# Patient Record
Sex: FEMALE | Race: WHITE | NOT HISPANIC OR LATINO | Employment: FULL TIME | URBAN - METROPOLITAN AREA
[De-identification: names, ages, dates, MRNs, and addresses within clinical notes are randomized per-mention and may not be internally consistent; named-entity substitution may affect disease eponyms.]

---
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
## 2021-05-27 NOTE — TELEPHONE ENCOUNTER
Due to be seen    Rx renewed per Medication Renewal Policy. Medication was last renewed on 8/4/18.    Lydia Estes, [facility] Triage/Med Refill 4/9/2019     Requested Prescriptions   Pending Prescriptions Disp Refills     TRI-SPRINTEC, 28, 0.18/0.215/0.25 mg-35 mcg (28) Tab tablet [Pharmacy Med Name: TRI-SPRINTEC TABLETS 28] 84 tablet 0     Sig: TAKE 1 TABLET BY MOUTH DAILY       Oral Contraceptives Protocol Passed - 4/8/2019  3:28 AM        Passed - Visit with PCP or prescribing provider visit in last 12 months      Last office visit with prescriber/PCP: Visit date not found OR same dept: 5/1/2018 Nelda Tillman NP OR same specialty: 5/1/2018 Nelda Tillman NP  Last physical: 5/9/2018 Last MTM visit: Visit date not found   Next visit within 3 mo: Visit date not found  Next physical within 3 mo: Visit date not found  Prescriber OR PCP: Dianna De Jesus MD  Last diagnosis associated with med order: 1. Contraception  - TRI-SPRINTEC, 28, 0.18/0.215/0.25 mg-35 mcg (28) Tab tablet [Pharmacy Med Name: TRI-SPRINTEC TABLETS 28]; TAKE 1 TABLET BY MOUTH DAILY  Dispense: 84 tablet; Refill: 0    If protocol passes may refill for 12 months if within 3 months of last provider visit (or a total of 15 months).

## 2021-05-27 NOTE — TELEPHONE ENCOUNTER
Called and informed patient via voicemail that her rx was sent in and that she will need a physical before her next refill will be given.     Susy Real, CMA

## 2021-05-27 NOTE — TELEPHONE ENCOUNTER
Please call her and let her know that we did refill her medication for 3 months however she needs to be seen for physical exam prior to any further refills.

## 2021-06-01 VITALS — WEIGHT: 116 LBS | BODY MASS INDEX: 18.64 KG/M2 | HEIGHT: 66 IN

## 2021-06-01 VITALS — HEIGHT: 68 IN | WEIGHT: 119.5 LBS | BODY MASS INDEX: 18.11 KG/M2

## 2021-06-02 VITALS — WEIGHT: 118 LBS | BODY MASS INDEX: 18.07 KG/M2

## 2021-06-03 NOTE — TELEPHONE ENCOUNTER
Please call her and let her know that we did refill her medication for 2 months however she needs to be seen for physical exam prior to any further refills.   Please assist patient with scheduling a physical exam sometime within the next month or 2.

## 2021-06-03 NOTE — TELEPHONE ENCOUNTER
RN cannot approve Refill Request    RN can NOT refill this medication Protocol failed and NO refill given. Last office visit: Visit date not found Last Physical: 5/9/2018 Last MTM visit: Visit date not found Last visit same specialty: 5/1/2018 Nelda Tillman NP.  Next visit within 3 mo: Visit date not found  Next physical within 3 mo: Visit date not found      Roxana Mohamud, [facility] Triage/Med Refill 11/21/2019    Requested Prescriptions   Pending Prescriptions Disp Refills     TRI-SPRINTEC, 28, 0.18/0.215/0.25 mg-35 mcg (28) tablet [Pharmacy Med Name: TRI-SPRINTEC TABLETS 28] 84 tablet 0     Sig: TAKE 1 TABLET BY MOUTH ONCE DAILY       Oral Contraceptives Protocol Failed - 11/20/2019 11:35 AM        Failed - Visit with PCP or prescribing provider visit in last 12 months      Last office visit with prescriber/PCP: Visit date not found OR same dept: Visit date not found OR same specialty: 5/1/2018 Nelda Tillman NP  Last physical: 5/9/2018 Last MTM visit: Visit date not found   Next visit within 3 mo: Visit date not found  Next physical within 3 mo: Visit date not found  Prescriber OR PCP: Dianna De Jesus MD  Last diagnosis associated with med order: 1. Uses birth control  - TRI-SPRINTEC, 28, 0.18/0.215/0.25 mg-35 mcg (28) tablet [Pharmacy Med Name: TRI-SPRINTEC TABLETS 28]; TAKE 1 TABLET BY MOUTH ONCE DAILY  Dispense: 84 tablet; Refill: 0    If protocol passes may refill for 12 months if within 3 months of last provider visit (or a total of 15 months).

## 2021-06-03 NOTE — PROGRESS NOTES
Chief Complaint   Patient presents with     pain in urination     Urinary Frequency         HPI    Patient is here for a few weeks of burning sensation at the end of urination with increased urinary frequency. NO gross hematuria, fever, flank pain, abdominal pain.    ROS: Pertinent ROS noted in HPI.     No Known Allergies    Patient Active Problem List   Diagnosis     Skin Neoplasm Of Uncertain Behavior     Light-headed feeling       Family History   Problem Relation Age of Onset     Lung disease Mother         carcinoid lung neoplasm removed     Allergic rhinitis Father      Thyroid disease Maternal Grandmother         had thyroid removed     Fibrocystic breast disease Maternal Grandmother      Breast cancer Maternal Grandmother         dx age 60     Fibromyalgia Maternal Grandfather      Breast cancer Paternal Grandmother      Heart disease Paternal Grandfather          age 57     Stroke Paternal Grandfather          age 57       Social History     Socioeconomic History     Marital status: Single     Spouse name: Not on file     Number of children: 0     Years of education: Not on file     Highest education level: Not on file   Occupational History     Occupation: student   Social Needs     Financial resource strain: Not on file     Food insecurity:     Worry: Not on file     Inability: Not on file     Transportation needs:     Medical: Not on file     Non-medical: Not on file   Tobacco Use     Smoking status: Never Smoker     Smokeless tobacco: Never Used   Substance and Sexual Activity     Alcohol use: No     Drug use: No     Sexual activity: Not Currently     Partners: Male   Lifestyle     Physical activity:     Days per week: Not on file     Minutes per session: Not on file     Stress: Not on file   Relationships     Social connections:     Talks on phone: Not on file     Gets together: Not on file     Attends Worship service: Not on file     Active member of club or organization: Not on file      Attends meetings of clubs or organizations: Not on file     Relationship status: Not on file     Intimate partner violence:     Fear of current or ex partner: Not on file     Emotionally abused: Not on file     Physically abused: Not on file     Forced sexual activity: Not on file   Other Topics Concern     Not on file   Social History Narrative     Not on file       Objective:    Vitals:    11/21/19 1256   BP: 120/79   Pulse: 73   Resp: 17   Temp: 98.5  F (36.9  C)   SpO2: 98%       Gen:NAD    CV: RRR, normal S1S2, no M, R, G  Pulm: CTAB, normal effort  Abd: Normal inspection, normal bowel sounds, soft, no pain, no mass/HSM  MSK: normal palpation of back, no CVA tenderness.     Recent Results (from the past 24 hour(s))   Urinalysis-UC if Indicated   Result Value Ref Range    Color, UA Yellow Colorless, Yellow, Straw, Light Yellow    Clarity, UA Clear Clear    Glucose, UA Negative Negative    Bilirubin, UA Negative Negative    Ketones, UA Trace (!) Negative    Specific Gravity, UA >=1.030 1.005 - 1.030    Blood, UA Negative Negative    pH, UA 5.5 5.0 - 8.0    Protein,  mg/dL (!) Negative mg/dL    Urobilinogen, UA 1.0 E.U./dL 0.2 E.U./dL, 1.0 E.U./dL    Nitrite, UA Negative Negative    Leukocytes, UA Trace (!) Negative    Bacteria, UA Moderate (!) None Seen hpf    RBC, UA None Seen None Seen, 0-2 hpf    WBC, UA 5-10 (!) None Seen, 0-5 hpf    Squam Epithel, UA 5-10 (!) None Seen, 0-5 lpf    Mucus, UA Many (!) None Seen lpf         Acute UTI  -     sulfamethoxazole-trimethoprim (BACTRIM DS) 800-160 mg per tablet; Take 1 tablet by mouth 2 (two) times a day for 3 days.    Urinary frequency  -     Urinalysis-UC if Indicated  -     Culture, Urine

## 2021-06-04 VITALS
WEIGHT: 121 LBS | DIASTOLIC BLOOD PRESSURE: 79 MMHG | SYSTOLIC BLOOD PRESSURE: 120 MMHG | BODY MASS INDEX: 18.53 KG/M2 | RESPIRATION RATE: 17 BRPM | HEART RATE: 73 BPM | OXYGEN SATURATION: 98 % | TEMPERATURE: 98.5 F

## 2021-06-16 PROBLEM — R42 LIGHT-HEADED FEELING: Status: ACTIVE | Noted: 2018-05-01

## 2021-06-17 NOTE — PROGRESS NOTES
1. Light-headed feeling         ASSESSMENT/PLAN:       1. Light-headed feeling    -hydration, minimum 64 ounces daily of water. Schedule annual physical, has not been seen in clinic in 5 years. Needs vaccinations.     25 minutes spent together with the patient today, more than 50% spent in counseling, discussing the above topics.        Nelda Tillman NP          OBJECTIVE:   LABS:     No results found for this or any previous visit (from the past 240 hour(s)).    Vitals:    05/01/18 1009   BP: 100/68   Pulse: 63   Resp: 12   Temp: 98.2  F (36.8  C)   SpO2: 98%     Wt Readings from Last 3 Encounters:   05/01/18 116 lb (52.6 kg) (42 %, Z= -0.21)*   04/15/13 104 lb 9 oz (47.4 kg) (82 %, Z= 0.93)*   01/15/13 102 lb 9 oz (46.5 kg) (84 %, Z= 0.98)*     * Growth percentiles are based on [facility] 2-20 Years data.         PROGRESS NOTE       SUBJECTIVE:  Gretchen Gillespie is a 17 y.o. female  who presents for dizziness that started 4 days ago.  She states that her dizziness is intermittent and she notices that it gets worse when she is working on her computer at school.  She does have a prescription for eyeglasses, however, her mother informs me that the patient does not wear these regularly because she does not like how they look when she wears them.  She does experience some headache pain in the forehead occasionally.  When I asked her how much water she drinks a day, she tells me maybe 2 glasses.  She also drinks milk for lunch.  Periods are regular, however, her mother informs me that her cramping is terrible and she becomes quite irritable.  She does have a tracker on her phone, however she does not premedicate 3 days prior to getting her period starting which I have advised her to do today with ibuprofen 800 mg 2-3 times per day.  She has not been seen in the clinic in 5 years.  She is well overdue for annual physical and is due for vaccinations.  She has also been struggling with anxiety issues due to getting out of a  relationship recently.  She has no thoughts of self-harm today and she no longer communicates with her ex. she denies alcohol and illicit drug use.  Her vital signs are stable today, no indication for hypotension or dehydration.   Chief Complaint   Patient presents with     Dizziness     x 3-4 days - light headed, HA         Patient Active Problem List   Diagnosis     Skin Neoplasm Of Uncertain Behavior     Light-headed feeling       No current outpatient prescriptions on file.     No current facility-administered medications for this visit.            PHYSICAL EXAM  General Appearance: Alert, NAD   Eyes: Clear, no conjunctivitis or drainage. No nystagmus  Ears:  TM's pearly grey, no erythema, no drainage.    Nose: Clear without rhinorrhea.   Throat:  Clear, no erythema.   Neck:   Supple, no significant adenopathy  Lungs:  Clear with equal air entry, no retractions or increased work of breathing  Cardiac: RRR without murmur, capillary refill less than 2 seconds  Abdomen:   Soft, nontender, no mass palpable.   Neuro: No gross abnormalities, DTRs intact, equal  strength, no drift  Skin:  No rash or jaundice, warm and dry  Psych: flat affect, answers appropriately, good eye contact

## 2021-06-17 NOTE — PATIENT INSTRUCTIONS - HE
"Patient Instructions by Wilder Gil MD at 11/21/2019 12:50 PM     Author: Wilder Gil MD Service: -- Author Type: Physician    Filed: 11/21/2019  1:49 PM Encounter Date: 11/21/2019 Status: Signed    : Wilder Gil MD (Physician)         Patient Education     Bladder Infection, Female (Adult)    Urine is normally doesn't have any bacteria in it. But bacteria can get into the urinary tract from the skin around the rectum. Or they can travel in the blood from elsewhere in the body. Once they are in your urinary tract, they can cause infection in the urethra (urethritis), the bladder (cystitis), or the kidneys (pyelonephritis).  The most common place for an infection is in the bladder. This is called a bladder infection. This is one of the most common infections in women. Most bladder infections are easily treated. They are not serious unless the infection spreads to the kidney.  The phrases \"bladder infection,\" \"UTI,\" and \"cystitis\" are often used to describe the same thing. But they are not always the same. Cystitis is an inflammation of the bladder. The most common cause of cystitis is an infection.  Symptoms  The infection causes inflammation in the urethra and bladder. This causes many of the symptoms. The most common symptoms of a bladder infection are:    Pain or burning when urinating    Having to urinate more often than usual    Urgent need to urinate    Only a small amount of urine comes out    Blood in urine    Abdominal discomfort. This is usually in the lower abdomen above the pubic bone.    Cloudy urine    Strong- or bad-smelling urine    Unable to urinate (urinary retention)    Unable to hold urine in (urinary incontinence)    Fever    Loss of appetite    Confusion (in older adults)  Causes  Bladder infections are not contagious. You can't get one from someone else, from a toilet seat, or from sharing a bath.  The most common cause of bladder infections is bacteria from the " bowels. The bacteria get onto the skin around the opening of the urethra. From there, they can get into the urine and travel up to the bladder, causing inflammation and infection. This usually happens because of:    Wiping improperly after urinating. Always wipe from front to back.    Bowel incontinence    Pregnancy    Procedures such as having a catheter inserted    Older age    Not emptying your bladder. This can allow bacteria a chance to grow in your urine.    Dehydration    Constipation    Sex    Use of a diaphragm for birth control   Treatment  Bladder infections are diagnosed by a urine test. They are treated with antibiotics and usually clear up quickly without complications. Treatment helps prevent a more serious kidney infection.  Medicines  Medicines can help in the treatment of a bladder infection:    Take antibiotics until they are used up, even if you feel better. It is important to finish them to make sure the infection has cleared.    You can use acetaminophen or ibuprofen for pain, fever, or discomfort, unless another medicine was prescribed. If you have chronic liver or kidney disease, talk with your healthcare provider before using these medicines. Also talk with your provider if you've ever had a stomach ulcer or gastrointestinal bleeding, or are taking blood-thinner medicines.    If you are given phenazopydridine to reduce burning with urination, it will cause your urine to become a bright orange color. This can stain clothing.  Care and prevention  These self-care steps can help prevent future infections:    Drink plenty of fluids to prevent dehydration and flush out your bladder. Do this unless you must restrict fluids for other health reasons, or your doctor told you not to.    Proper cleaning after going to the bathroom is important. Wipe from front to back after using the toilet to prevent the spread of bacteria.    Urinate more often. Don't try to hold urine in for a long time.    Wear  loose-fitting clothes and cotton underwear. Avoid tight-fitting pants.    Improve your diet and prevent constipation. Eat more fresh fruit and vegetables, and fiber, and less junk and fatty foods.    Avoid sex until your symptoms are gone.    Avoid caffeine, alcohol, and spicy foods. These can irritate your bladder.    Urinate right after intercourse to flush out your bladder.    If you use birth control pills and have frequent bladder infections, discuss it with your doctor.  Follow-up care  Call your healthcare provider if all symptoms are not gone after 3 days of treatment. This is especially important if you have repeat infections.  If a culture was done, you will be told if your treatment needs to be changed. If directed, you can call to find out the results.  If X-rays were done, you will be told if the results will affect your treatment.  Call 911  Call 911 if any of the following occur:    Trouble breathing    Hard to wake up or confusion    Fainting or loss of consciousness    Rapid heart rate  When to seek medical advice  Call your healthcare provider right away if any of these occur:    Fever of 100.4 F (38.0 C) or higher, or as directed by your healthcare provider    Symptoms are not better by the third day of treatment    Back or belly (abdominal) pain that gets worse    Repeated vomiting, or unable to keep medicine down    Weakness or dizziness    Vaginal discharge    Pain, redness, or swelling in the outer vaginal area (labia)  Date Last Reviewed: 10/1/2016    [id number] [employer]. [address] [place] [address], [place] [address]. All rights reserved. This information is not intended as a substitute for professional medical care. Always follow your healthcare professional's instructions.

## 2021-06-17 NOTE — PROGRESS NOTES
"17 Year Well Child Check    Height:  5' 7.75\" (1.721 m) (93 %, Z= 1.45, Source: [facility] Years)  Weight: 119 lb 8 oz (54.2 kg) (49 %, Z= -0.02, Source: [facility] 2-20 Years)  Blood Pressure: 100/60  BMI: Body mass index is 18.3 kg/(m^2).  BSA: Body surface area is 1.61 meters squared.    SUBJECTIVE    Concerns: None, child doing well.  She is eating 3 meals a day plus several snacks throughout the day.  We discussed how important it is that she drink 3 glasses of milk a day.  We discussed the rationale behind that as well.  She is drinking about 1 glass of milk a day and will try to increase that up to 3 glasses of milk a day.  She is in 10th grade at [employer].  Math is her favorite subject.  She is not sure exactly what she wants to do after she is done with school.  She has many friends at school and has no concerns about how school is going.  Parents and teachers do not feel like there is any problems with school either.  She was seen not that long ago for some lightheadedness and some anxiety.  Please see PHQ 9 and VERENICE which are both completed in their entirety today.  Mom notes that she seems to be having more panic attacks in the past few months.  Some of them get really bad and sometimes they last all day.  She has already gotten an appointment for a counselor and I think that is the best way to start.  She may very well need some medications for this as well but parents will start with the counseling appointment and then go from there.  She has been sexually active in the past.  She just became sexually active about a month ago or so.  We talked about the fact that since she has become sexually active she does need to be checked for STDs.  She would like to be checked for HIV syphilis and chlamydia and gonorrhea today.  She does have normal regular menstrual cycles.  She does get bad cramps with them and therefore is wondering about starting on some kind of birth control both for her bad menstrual " cramps as well as protection now that she is sexually active.  She is no longer going out with the gentleman that she was sexually active with a month ago but still would like to be protected for future encounters.  Vision seems to be fine.  She was unable to hear several of the tones on the hearing test and probably does need to be evaluated formally by the audiologist.    Family Unit: Mom Dad younger brother    Temperament: Calm, happy, independent and energetic    Patient brought in by Mom    History reviewed. No pertinent past medical history.    Past Surgical History:   Procedure Laterality Date     MOLE REMOVAL      3rd grade       Family History   Problem Relation Age of Onset     Lung disease Mother      carcinoid lung neoplasm removed     Allergic rhinitis Father      Thyroid disease Maternal Grandmother      had thyroid removed     Fibrocystic breast disease Maternal Grandmother      Breast cancer Maternal Grandmother      dx age 60     Fibromyalgia Maternal Grandfather      Breast cancer Paternal Grandmother      Heart disease Paternal Grandfather       age 57     Stroke Paternal Grandfather       age 57       Cardiovascular risk factors: Yes mother grandparents- Heart attack     Immunization History   Administered Date(s) Administered     Dtap 2002, 2002, 2003, 2004, 2007     Hep B / HiB 2003     Hep B, Adult 2002, 2004     HiB, historic,unspecified 2002, 2003, 2004     IPV 2002, 2003, 2004, 2007     Influenza, inj, historic,unspecified 09/15/2009, 10/19/2009, 2013     MMR 2003, 2007     MMRV 2007     Pneumo Conj 7-V(before ) 2002, 2002, 2003     Varicella 2002, 2007       Family/Peer Relationships: Gets along well with her peers as well as other adults.    Sports/Exercise/Activities: Is involved with a variety of different activities.  mary  drawing, photography.    Nutrition:  The patient eats a regular, healthy diet.    Sleep habits:  Night: 7 hours    Elimination: once a day    TB Risk Assessment:  The patient and/or parent/guardian answer positive to:  patient and/or parent/guardian answer 'no' to all screening TB questions    Requested Prescriptions     Signed Prescriptions Disp Refills     norgestimate-ethinyl estradiol (ORTHO TRI-CYCLEN, 28,) 0.18/0.215/0.25 mg-35 mcg (28) Tab tablet 3 Package 0     Sig: Take 1 tablet by mouth daily.       Social History:  Sexually active: she is sexually acyive . She had her first sexual encounter about a month ago. She is no longer dating this individual.   Alcohol/Drug use: The patient denies use of alcohol, tobacco, or illicit drugs.  Safety concerns: The patient denies any history of significant injuries.  Abuse concerns: no concerns  Legal concerns: The patient has no significant history of legal issues.  Education: Grade: 10th grade, [employer].  Employment: The patient is not employed.  Depression/Anxiety: Patient has had some episodes of panic attacks recently.  She is already scheduled to see a counselor in a couple of weeks and parents will let me know how things progress and if they desire discussion regarding occasions.    She has established with the dentist and sees them on a regular basis per    Social stressors/Changes: No concerns     VISION/HEARING  Vision: Completed. See Results  Hearing:  Completed. See Results      REVIEW OF SYSTEMS  Constitutional: Negative.  Negative for fever, activity change, appetite change and irritability.   HENT: Negative.  Negative for congestion, ear pain and voice change.    Eyes: Negative.  Negative for discharge and redness.   Respiratory: Negative.  Negative for apnea, choking and wheezing.    Cardiovascular: Negative.  Negative for cyanosis.   Gastrointestinal: Negative.  Negative for diarrhea, constipation, blood in stool and abdominal distention.    Endocrine: Negative.    Genitourinary: Negative.  Negative for decreased urine volume.   Musculoskeletal: Negative.  Negative for gait problem.   Skin: Negative.  Negative for color change and rash.   Allergic/Immunologic: Negative.  Negative for environmental allergies and food allergies.   Neurological: Negative.  Negative for seizures, facial asymmetry and weakness.   Hematological: Negative.  Does not bruise/bleed easily.   Psychiatric/Behavioral: Negative.  Negative for behavioral problems. The patient is not hyperactive.      PHYSICAL EXAM  General Appearance:   Alert, NAD   Eyes: Clear  Ears:  TM's pearly grey  Nose: Clear   Throat:  Clear   Neck:   Supple, no significant adenopathy  Lungs:  Clear with equal air entry, no retractions or increased work of breathing  Cardiac: RRR without murmur, capillary refill less than 2 seconds  Abdomen:   Soft, nontender, no hepatosplenomegaly or mass palpable  Genitourinary: Normal Female  genitalia.   Musculoskeletal:  Normal   Skin:  No rash or jaundice    ANTICIPATORY GUIDANCE  Specific topics reviewed: drugs, ETOH, and tobacco, importance of regular dental care, importance of regular exercise, importance of varied diet and sex; STD and pregnancy prevention.      REFERRALS  Dental: The patient has already established care with a dentist.    IMMUNIZATIONS/LABS  Patient's immunizations were discussed with mom and patient at length today.  She is due for a Tdap as well as hepatitis A HPV and Menactra vaccine.  She did not receive immunizations before seventh grade.  Mom is quite concerned about the aluminum and formaldehyde in the Tdap vaccine.  They do not feel like she needs hepatitis A or HPV vaccine at this time.  We spent a long time discussing the pros and cons of all these immunizations.  Mom wanted her to get a meningitis vaccine today however patient did not desire that.  In the end mom decided to wait until 3 months from now to get that vaccine when they  return for follow-up.    ASSESSMENT/PLAN    1. Encounter for routine child health examination without abnormal findings  - Hemoglobin  - Lipid [place] RANDOM  - Hearing Screening  - Vision Screening    2. Light-headed feeling  - Vitamin D, Total (25-Hydroxy)    3. Exposure to STD  - HIV Antigen/Antibody Screening [place]  - Syphilis Screen, Cascade  - Chlamydia trachomatis & Neisseria gonorrhoeae, Amplified Detection    4. Hearing loss  - Ambulatory referral to Audiology    5. Contraception  - norgestimate-ethinyl estradiol (ORTHO TRI-CYCLEN, 28,) 0.18/0.215/0.25 mg-35 mcg (28) Tab tablet; Take 1 tablet by mouth daily.  Dispense: 3 Package; Refill: 0      Patient is a 17  y.o. 4  m.o. female here for well child check. She is overall doing well. She is growing well. Immunizations discussion took place today.  Mom is hesitant to get Tdap today due to the aluminum and formaldehyde in that vaccine.  They do not feel like she needs hepatitis A or HPV vaccines at this time.  We discussed the rationale behind getting both of these vaccines they declined today.  Mom wanted her to get a meningitis shot today however patient refused and therefore mom elected to give this at her next follow-up in 3 months.  In terms of anxiety she has been set up to see a counselor and mom will let me know if she needs additional assistance with perhaps starting on medication or other ways to help with her anxiety.  The counseling appointment is in a couple of weeks.  HIV syphilis and Chlamydia gonorrhea testing were done today since now patient is sexually active.  We discussed using condoms with all sexual encounters to try to prevent STDs and patient understands that.  They did desire vitamin D testing today due to her lightheaded feeling that she had a couple of weeks ago.  Will check vitamin D level today and will contact her with results of that when it returns.  Lipid panel as well as hemoglobin was drawn today as well.  I was going to  do a urinalysis however with the fact that I did do chlamydia and gonorrhea testing will wait and do the urinalysis when she returns in 3 months.  I did refer them to audiology due to the fact that she could not hear all of the levels on her hearing test today.  She has become sexually active and would like to start on birth control.  We discussed different methods of birth control including NuvaRing birth control pills double shot Nexplanon and an IUD.  At the conclusion of our discussion she has elected to start on birth control pills.  She is using these both for prevention of pregnancy now that she is sexually active as well as to hopefully decrease the severity of her cramping with her menstrual cycles.  Prescription for Ortho Tri-Cyclen was sent to the pharmacy today.  She needs to wait until her next period comes to start the ocp.  She should start the Sunday after she starts bleeding. If her period starts on Tuesday or Wednesday, she starts that next Sunday. If her period starts Saturday, she starts the next day. If her period starts Sunday, she starts that day.  She needs to finish an entire pack of ocp's  before she is protected against pregnancy. If she is started on antibiotics, she needs to use another form of birth control for that entire month. If she has any unusual bleeding, meaning any bleeding other than that which would be expected during the time she normally has her period, she needs to use another form of birth control for that entire month as well.  If she has any bleeding other than the last week of the pill pack, she is not protected against pregnancy. She should use another form of birth control until she has had a month where the only bleeding she has is during the last week of the pill pack. She needs to take a pill every day and at the same time every day. This was all explained to patient today and she voiced understanding of all instructions.  I did give her prescription for 3 month  supply of the Ortho Tri-Cyclen today.  I would like to see her back in about 3 months to recheck how she is doing in terms of the Ortho Tri-Cyclen but also to recheck on her anxiety.  When she is here at that visit we can get a urinalysis as well.  The hope would be that we could also get a meningitis shot at least at that visit as well.  Mom is going to think about the Tdap vaccine.  We had a long discussion today about the aluminum and formaldehyde in the Tdap vaccination and the fact that it is very small amounts and she is probably exposed to more than that on a daily basis just in the general environment.  Vision appears to be normal.  Parents concerns addressed today. They should return at 18 years of age for next well child check. They will call with additional problems or concerns.   Dianna De Jesus MD

## 2021-06-18 NOTE — PROGRESS NOTES
Audiology only; referred by Dianna De Jesus    History:  Referred on PCP hearing screening 5-9-18; there is some current concern with panic attacks and Gretchen will be seeing a counselor soon, but she otherwise has no concerns for hearing ability or academic problems at this time. There has been no recent illness or history of significant noise exposure.    Results:  Otoscopy was unremarkable in either ear.  Conventional audiometry; good reliability using circumaural phones.  Normal hearing sensitivity, bilaterally, for 250-8000 Hz.  Speech reception thresholds showed agreement with frequency-specific responses for each ear.  Tympanometry was consistent with normal middle ear function, bilaterally.    Recommendations:  Follow-up with PCP; retest hearing per medical management or patient/parental concern.  Wear hearing protection in noise to preserve residual hearing sensitivity. Hearing sensitivity and middle ear function are adequate at this time in both ears for continued development and academic progress.    Pamela Barnett, [facility]  [place] Licensed Audiologist [id number]

## 2021-06-20 NOTE — PROGRESS NOTES
Impression:  Upper respiratory infection with residual bronchospasm    Plan:  Albuterol inhaler 2 puffs 4 times daily as needed, rest, fluids, avoid cold air and exercise until symptoms resolve and follow-up with primary care      Chief Complaint:  Chief Complaint   Patient presents with     poss Dry cough     x2days          HPI:   Gretchen Gillespie is a 17 y.o. female who presents to this clinic for the evaluation of difficulty breathing.  The patient had an upper respiratory tract infection over the past 10 days with runny nose, congestion, sore throat, and cough.  Most of the symptoms are resolved but she continues to complain of an occasional cough and a funny feeling when the air goes in and out of her lungs.  She does not hear any extra sounds.  Does not complain of shortness of breath.  But states she feels like magnetic or Velcro-like feeling when she breathes.  She cannot describe it any better than that.  No fever chills no abdominal pain or vomiting.  No other symptoms      PMH:   No past medical history on file.  Past Surgical History:   Procedure Laterality Date     MOLE REMOVAL      3rd grade         ROS:    All other systems negative    Meds:    Current Outpatient Prescriptions:      TRINESSA, 28, 0.18/0.215/0.25 mg-35 mcg (28) Tab tablet, TAKE 1 TABLET BY MOUTH DAILY, Disp: 84 tablet, Rfl: 2        Social:  Social History     Social History     Marital status: Single     Spouse name: N/A     Number of children: 0     Years of education: N/A     Occupational History     student      Social History Main Topics     Smoking status: Never Smoker     Smokeless tobacco: Never Used     Alcohol use No     Drug use: No     Sexual activity: Not Currently     Partners: Male     Other Topics Concern     Not on file     Social History Narrative         Physical Exam:  Vital signs reviewed in a chair in no distress, appears well  Eyes: PERRL, EOMI  Head: Atraumatic and normocephalic  Pharynx: Clear, airway  patent  Neck: No mass or tenderness  Lungs: Good air movement but slight prolongation of the expiratory phase and I did hear one inspiratory wheeze that cleared with deep breathing.  CV: Regular without murmur  Skin: No lesions or rash  Neuro: Normal motor and sensory function in all extremities  Psych: Awake, alert, normally responsive      Results:    No results found for this or any previous visit (from the past 24 hour(s)).    No results found.      Mark Rodriguez MD

## 2024-02-07 ENCOUNTER — OFFICE VISIT (OUTPATIENT)
Dept: FAMILY MEDICINE | Facility: CLINIC | Age: 24
End: 2024-02-07

## 2024-02-07 VITALS
HEART RATE: 79 BPM | TEMPERATURE: 98 F | HEIGHT: 68 IN | DIASTOLIC BLOOD PRESSURE: 80 MMHG | BODY MASS INDEX: 18.94 KG/M2 | WEIGHT: 125 LBS | OXYGEN SATURATION: 99 % | SYSTOLIC BLOOD PRESSURE: 118 MMHG

## 2024-02-07 DIAGNOSIS — J06.9 VIRAL URI WITH COUGH: Primary | ICD-10-CM

## 2024-02-07 DIAGNOSIS — Z20.828 EXPOSURE TO THE FLU: ICD-10-CM

## 2024-02-07 LAB
FLUAV AG SPEC QL IA: NEGATIVE
FLUBV AG SPEC QL IA: NEGATIVE

## 2024-02-07 PROCEDURE — 87804 INFLUENZA ASSAY W/OPTIC: CPT | Performed by: NURSE PRACTITIONER

## 2024-02-07 PROCEDURE — 99203 OFFICE O/P NEW LOW 30 MIN: CPT | Performed by: NURSE PRACTITIONER

## 2024-02-07 NOTE — PROGRESS NOTES
Assessment & Plan     Viral URI with cough  Influenza testing negative.  Patient declines a COVID test today.  Recommend symptomatic cares including rest and plenty of fluids.  Tylenol and/or ibuprofen as needed for any pain.  Discussed symptoms that would warrant urgent follow-up.    Exposure to the flu  - Influenza A & B Antigen - Clinic Collect          Subjective   Gretchen is a 23 year old who presents with concerns regarding the flu.  Patient was exposed to influenza through her boyfriend.  She developed symptoms of a nonproductive cough and headache yesterday.  Associated symptoms include some nausea and back/abdominal pain from coughing.  Denies any fevers or chills.  No sore throat or ear pain.  No history of asthma or smoking.  Denies any shortness of breath or wheezing.  Over-the-counter treatments include ibuprofen.    Cold Symptoms (Flu exposure, symptoms started yesterday 2/6; nausea, headache, coughing, back and stomach pain)    History of Present Illness       Back Pain:  She presents for follow up of back pain. Patient's back pain is a new problem.    Original cause of back pain: not sure  First noticed back pain: today  Patient feels back pain: comes and goesLocation of back pain:  Right middle of back and left middle of back  Description of back pain: dull ache  Back pain spreads: nowhere    Since patient first noticed back pain, pain is: unchanged  Does back pain interfere with her job:  No  On a scale of 1-10 (10 being the worst), patient describes pain as:  3  What makes back pain worse: coughing, lying down and twisting   Acupuncture: not tried  Acetaminophen: helpful  Activity or exercise: not tried  Chiropractor:  Not tried  Cold: not tried  Heat: helpful  Massage: helpful  Muscle relaxants: not tried  NSAIDS: helpful  Opioids: not tried  Physical Therapy: not tried  Rest: helpful  Steroid Injection: not tried  Stretching: helpful  Surgery: not tried  TENS unit: not tried  Topical pain  "relievers: not tried  Other healthcare providers patient is seeing for back pain: None    Headaches:   Since the patient's last clinic visit, headaches are: no change  The patient is getting headaches:  Once a week headache, once a month migraine  She is not able to do normal daily activities when she has a migraine.  The patient is taking the following rescue/relief medications:  Ibuprofen (Advil, Motrin)   Patient states \"The relief is inconsistent\" from the rescue/relief medications.   The patient is taking the following medications to prevent migraines:  No medications to prevent migraines  In the past 4 weeks, the patient has gone to an Urgent Care or Emergency Room 0 times times due to headaches.    Reason for visit:  Influenza exposure and symptoms  Symptom onset:  1-3 days ago  Symptoms include:  Coughing, nausea,headache,back pain,sinus pressure  Symptom intensity:  Moderate  Symptom progression:  Worsening  Had these symptoms before:  Yes  Has tried/received treatment for these symptoms:  No  What makes it better:  Steam helps with cough    She eats 0-1 servings of fruits and vegetables daily.She consumes 1 sweetened beverage(s) daily.She exercises with enough effort to increase her heart rate 10 to 19 minutes per day.  She exercises with enough effort to increase her heart rate 5 days per week.   She is taking medications regularly.         Pertinent items in HPI        Objective    /80 (BP Location: Right arm, Patient Position: Sitting, Cuff Size: Adult Regular)   Pulse 79   Temp 98  F (36.7  C) (Temporal)   Ht 1.721 m (5' 7.75\")   Wt 56.7 kg (125 lb)   LMP  (LMP Unknown)   SpO2 99%   BMI 19.15 kg/m    Body mass index is 19.15 kg/m .  Physical Exam   GENERAL: alert and no distress  EYES: Eyes grossly normal to inspection, PERRL and conjunctivae and sclerae normal  HENT: ear canals and TM's normal, nose and mouth without ulcers or lesions  NECK: no adenopathy, no asymmetry, masses, or " scars  RESP: lungs clear to auscultation - no rales, rhonchi or wheezes  CV: regular rate and rhythm, normal S1 S2, no S3 or S4, no murmur, click or rub, no peripheral edema             Signed Electronically by: Giovanna Rodriguez NP

## 2024-02-07 NOTE — LETTER
February 9, 2024      Gretchen Gillespie  [address]  [place] [address]        Dear MsAdenikeJose Miguel,    We are writing to inform you of your test results.    Flu testing is negative. Continue with symptomatic cares at home. Follow up with any new or worsening symptoms.     Resulted Orders   Influenza A & B Antigen - Clinic Collect   Result Value Ref Range    Influenza A antigen Negative Negative    Influenza B antigen Negative Negative    Narrative    Test results must be correlated with clinical data. If necessary, results should be confirmed by a molecular assay or viral culture.       If you have any questions or concerns, please call the clinic at the number listed above.       Sincerely,      Giovanna Rodriguez NP